# Patient Record
(demographics unavailable — no encounter records)

---

## 2024-10-07 NOTE — HISTORY OF PRESENT ILLNESS
[Acute] : for an acute visit [Abdominal Pain] : abdominal pain [Headache] : Headache [FreeTextEntry6] : Pt in health clinic for menstrual cramps 7/10 and headache. Pt is on day 3 of menstrual period.  Pt did not eat today.

## 2024-10-07 NOTE — PHYSICAL EXAM
[General Appearance - Alert] : alert [General Appearance - Well Developed] : interactive [Appearance Of Head] : the head was normocephalic [Evidence Of Head Injury] : threre was no evidence of injury [Sclera] : the sclera and conjunctiva were normal [PERRL With Normal Accommodation] : pupils were equal in size, round, reactive to light, with normal accommodation [Outer Ear] : the ears and nose were normal in appearance [] : no respiratory distress [Respiration, Rhythm And Depth] : normal respiratory rhythm and effort [Abnormal Walk] : normal gait [Nail Clubbing] : no clubbing  or cyanosis of the fingernails [Musculoskeletal - Swelling] : no joint swelling seen [Musculoskeletal Exam: Normal Movement Of All Extremities] : normal movements of all extremities [Skin Color & Pigmentation] : normal skin color and pigmentation [Skin Turgor] : normal skin turgor [Initial Inspection: Infant Active And Alert] : active and alert [Demonstrated Behavior - Infant Nonreactive To Parents] : interactive

## 2024-10-07 NOTE — DISCUSSION/SUMMARY
[FreeTextEntry1] : Dysmenorrhea.  Headache,  Acetaminophen 650 mg po given in health clinic.   Health counseling reviewed.   Pt encouraged to eat.   RTC as needed

## 2024-11-26 NOTE — HISTORY OF PRESENT ILLNESS
[de-identified] : menstrual cramps [FreeTextEntry6] : 15 year old female who presenting  with complaints menstrual cramps.  Pain started about an hour ago.  She describes it as cramping in her lower abdomen. She denies any associated symptoms such as nausea, vomiting, or diarrhea. The patient reports that her last menstrual period started on October 30th and ended on November 8th.  She typically experiences cramps during her periods, which usually start on the second day and last for a few days. The patient avoids taking pain medications as she believes they are unhealthy.  Has a boyfriend has never been sexually active

## 2024-11-26 NOTE — DISCUSSION/SUMMARY
[FreeTextEntry1] : 15 year old female experiencing dysmenorrhea, or menstrual cramps, which is a common condition among females during their menstrual cycles. The cramps are likely due to the release of prostaglandins, which cause uterine contractions and pain. - Therapeutic Interventions: Recommend taking ibuprofen 600 mg as needed for pain relief during menstrual cramps. Ibuprofen is a non-steroidal anti-inflammatory drug (NSAID) that can help alleviate menstrual cramps by reducing prostaglandin levels. - Patient Education: Educate the patient on the proper use of ibuprofen, including dosage, timing, and potential side effects. Advise her to take the medication with food to minimize gastrointestinal discomfort. Discuss alternative non-pharmacological options for managing menstrual cramps, such as applying a heating pad, exercising, and practicing relaxation techniques.

## 2025-01-10 NOTE — HISTORY OF PRESENT ILLNESS
[FreeTextEntry6] : Pt in health clinic for headache, nasal congestion, and burning sensation in throat x 1 day.  Pt recently moved from [facility], mother still remains in [facility].

## 2025-01-10 NOTE — DISCUSSION/SUMMARY
[FreeTextEntry1] : Headache,   Acetaminophen 650 mg po given in health clinic.   Nasopharyngitis.   Advised pt to purchase OTC Flonase and use as directed.    Negative depression screening offered  services/guidance counselor services.  Pt talks about mother is living in [facility] waiting for visa, stressed w/ [employer]/finals/breakup  Pt declined  services will RTC  Health counseling reviewed.   Safe dc to class.

## 2025-01-10 NOTE — PHYSICAL EXAM
[General Appearance - Alert] : alert [General Appearance - Well Developed] : interactive [Appearance Of Head] : the head was normocephalic [Evidence Of Head Injury] : threre was no evidence of injury [Sclera] : the sclera and conjunctiva were normal [PERRL With Normal Accommodation] : pupils were equal in size, round, reactive to light, with normal accommodation [Outer Ear] : the ears and nose were normal in appearance [Both Tympanic Membranes Were Examined] : both tympanic membranes were normal [Nasal Cavity] : the nasal mucosa and septum were normal [Oropharynx] : the oropharynx was normal  [FreeTextEntry1] : post nasal drip. [] : no respiratory distress [Respiration, Rhythm And Depth] : normal respiratory rhythm and effort [Auscultation Breath Sounds / Voice Sounds] : clear bilateral breath sounds [Heart Rate And Rhythm] : heart rate and rhythm were normal [Heart Sounds] : normal S1 and S2 [Heart Sounds Gallop] : no gallops [Murmurs] : no murmurs [Abnormal Walk] : normal gait [Nail Clubbing] : no clubbing  or cyanosis of the fingernails [Skin Color & Pigmentation] : normal skin color and pigmentation [Skin Turgor] : normal skin turgor [Initial Inspection: Infant Active And Alert] : active and alert [Demonstrated Behavior - Infant Nonreactive To Parents] : interactive